# Patient Record
Sex: FEMALE | Race: WHITE | NOT HISPANIC OR LATINO | Employment: OTHER | URBAN - METROPOLITAN AREA
[De-identification: names, ages, dates, MRNs, and addresses within clinical notes are randomized per-mention and may not be internally consistent; named-entity substitution may affect disease eponyms.]

---

## 2024-03-26 ENCOUNTER — APPOINTMENT (OUTPATIENT)
Dept: OTOLARYNGOLOGY | Facility: CLINIC | Age: 79
End: 2024-03-26
Payer: MEDICARE

## 2024-03-26 ENCOUNTER — APPOINTMENT (OUTPATIENT)
Dept: AUDIOLOGY | Facility: CLINIC | Age: 79
End: 2024-03-26
Payer: MEDICARE

## 2024-05-04 NOTE — PROGRESS NOTES
Subjective   Patient ID: Sugey Marie is a 78 y.o. female who presents for Follow-up.  HPI  This 78-year-old female last seen in this office in September of last year is being seen in follow-up due to a past history of sensorineural hearing loss and Ménière's disease.  She had been followed by my previous associate and in February 2020 she had experienced bouts of vertigo.  These episode lasted for a few hours to a day before breaking.  She was seen for vestibular therapy Talya Marei at [facility] who felt she had right vestibular weakness likely secondary to Ménière's disease.   She has been advised to avoid excessive salt in her diet excessive and caffeinated beverages since she had previously felt her vertigo episodes were exacerbated by salt intake..  An audiogram was discussed as a follow-up at this visit.  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    Right shoulder pain 05/08/2024    Sensorineural hearing loss, bilateral 05/08/2024    Status post total right knee replacement 02/26/2018    Vasomotor rhinitis 05/08/2024     Resolved Ambulatory Problems     Diagnosis Date Noted    No Resolved Ambulatory Problems     Past Medical History:   Diagnosis Date    Unspecified osteoarthritis, unspecified site           Current Outpatient Medications:     estradiol (Estrace) 0.01 % (0.1 mg/gram) vaginal cream, Insert 0.5 Applicatorfuls (2 g) into the vagina once daily., Disp: , Rfl:     valACYclovir (Valtrex) 1 gram tablet, Take 1 tablet  "(1,000 mg) by mouth 4 times a day., Disp: , Rfl:     fluticasone (Flonase) 50 mcg/actuation nasal spray, Administer 1 spray into each nostril once daily. Shake gently. Before first use, prime pump. After use, clean tip and replace cap., Disp: , Rfl:     meclizine (Antivert) 25 mg tablet, Take 1 tablet (25 mg) by mouth 3 times a day as needed for dizziness., Disp: , Rfl:     multivitamin tablet, Take 1 tablet by mouth once daily., Disp: , Rfl:     omega-3 acid ethyl esters (Lovaza) 1 gram capsule, Take 1 capsule (1 g) by mouth 2 times a day., Disp: , Rfl:      Social History     Tobacco Use    Smoking status: Never    Smokeless tobacco: Never   Substance Use Topics    Alcohol use: Not Currently        Allergies   Allergen Reactions    Sulfamethoxazole-Trimethoprim Other     Redness and sores in mouth        Height 1.562 m (5' 1.5\"), weight 60.8 kg (134 lb).   Objective   Physical Exam  EXAMINATION:     GENERAL CARMITA.EARANCE: Alert, in no acute distress, normal pitch and clarity of voice, well-developed and nourished, cooperative.     HEAD/FACE: Normocephalic, atraumatic, normal facial movements and strength, no no tenderness to palpation, no lesions noted.     SKIN: Normal turgor, no raised or ulcerative lesions, warm and dry to palpation.     EYES: Extraocular motions intact, no nystagmus noted, pupils equal and reactive to light and accommodation, no conjunctivitis.     EARS: Both ears--external ear anatomy is normal without lesions, auditory canals are patent and without skin abrasions or lesions, mild ceruminous debris is removed from the right ear with a wax loop, hearing is intact to voice, tympanic membranes are intact with no acute inflammation, light reflexes present, no effusions are noted and no mastoid tenderness found to palpation.     NOSE: No external skin lesions are noted, nares are patent, septum is intact, sinuses are nontender to palpation bilaterally, no intranasal lesions or inflammation is noted, " nasal valve is normal.     OROPHARYNX/ORAL CAVITY: Oropharynx is not inflamed and is without lesions, mucosa of the oral cavity is intact and without lesions, tongue is midline and mobile, no acute dental disease is noted, TMJs are mobile     NECK: No lymphadenopathy is palpated, carotid pulses are intact, neck is supple with full range of motion, no thyroid abnormalities are noted, trachea is midline, no neck masses are palpated.     LYMPHATICS: No cervical adenopathy or supraclavicular adenopathy is palpated.     NEUROLOGIC/PSYCH; alert and oriented, cranial nerves are grossly intact, gait is without falling, no motor deficits are noted.        Her audiogram today revealed mild hearing loss from 500 to 8000 Hz moderate in the to low frequencies.  Discrimination scores are 100% bilaterally with normal tympanograms  Assessment/Plan   Problem List Items Addressed This Visit    None  Visit Diagnoses         Codes    Sensorineural hearing loss (SNHL) of both ears    -  Primary H90.3    Meniere's disease of right ear     H81.01          I reviewed the audiogram finds with the patient.  She has stable hearing in both ears with no breakthrough Ménière's symptoms.  She is going to continue to stay well-hydrated, avoid salty foods and avoid caffeine.  If she does have a Ménière's attack then Antivert would be used as a sedative.  She should have her hearing followed yearly and should be protecting her ears from loud noise when exposed.  She has had some vasomotor rhinitis symptoms and we talked about the use of saline as a nasal cleansing spray and the use of a topical steroidal spray.  The use of Atrovent or an antihistamine spray was contraindicated due to eye dryness.  There does not appear to be any other active head neck issues at this time.       Caleb Forde DMD, MD 05/08/24 11:30 AM

## 2024-05-08 ENCOUNTER — OFFICE VISIT (OUTPATIENT)
Dept: OTOLARYNGOLOGY | Facility: CLINIC | Age: 79
End: 2024-05-08
Payer: MEDICARE

## 2024-05-08 ENCOUNTER — CLINICAL SUPPORT (OUTPATIENT)
Dept: AUDIOLOGY | Facility: CLINIC | Age: 79
End: 2024-05-08

## 2024-05-08 VITALS — HEIGHT: 62 IN | WEIGHT: 134 LBS | BODY MASS INDEX: 24.66 KG/M2

## 2024-05-08 DIAGNOSIS — J30.0 VASOMOTOR RHINITIS: ICD-10-CM

## 2024-05-08 DIAGNOSIS — H81.01 MENIERE'S DISEASE OF RIGHT EAR: ICD-10-CM

## 2024-05-08 DIAGNOSIS — H90.3 SENSORINEURAL HEARING LOSS (SNHL) OF BOTH EARS: Primary | ICD-10-CM

## 2024-05-08 PROBLEM — H61.21 EXCESSIVE CERUMEN IN EAR CANAL, RIGHT: Status: ACTIVE | Noted: 2024-05-08

## 2024-05-08 PROBLEM — G56.01 CARPAL TUNNEL SYNDROME, RIGHT: Status: ACTIVE | Noted: 2024-05-08

## 2024-05-08 PROBLEM — N64.89 PSEUDOANGIOMATOUS STROMAL HYPERPLASIA OF BREAST: Status: ACTIVE | Noted: 2018-02-02

## 2024-05-08 PROBLEM — Z96.651 STATUS POST TOTAL RIGHT KNEE REPLACEMENT: Status: ACTIVE | Noted: 2018-02-26

## 2024-05-08 PROBLEM — M25.511 RIGHT SHOULDER PAIN: Status: ACTIVE | Noted: 2024-05-08

## 2024-05-08 PROBLEM — N63.25 BREAST LUMP ON LEFT SIDE AT 3 O'CLOCK POSITION: Status: ACTIVE | Noted: 2018-01-25

## 2024-05-08 PROBLEM — N60.12 FIBROCYSTIC BREAST CHANGES OF BOTH BREASTS: Status: ACTIVE | Noted: 2018-01-25

## 2024-05-08 PROBLEM — N60.11 FIBROCYSTIC BREAST CHANGES OF BOTH BREASTS: Status: ACTIVE | Noted: 2018-01-25

## 2024-05-08 PROCEDURE — 1159F MED LIST DOCD IN RCRD: CPT | Performed by: OTOLARYNGOLOGY

## 2024-05-08 PROCEDURE — 1036F TOBACCO NON-USER: CPT | Performed by: OTOLARYNGOLOGY

## 2024-05-08 PROCEDURE — 1160F RVW MEDS BY RX/DR IN RCRD: CPT | Performed by: OTOLARYNGOLOGY

## 2024-05-08 PROCEDURE — 92557 COMPREHENSIVE HEARING TEST: CPT | Performed by: AUDIOLOGIST

## 2024-05-08 PROCEDURE — 99214 OFFICE O/P EST MOD 30 MIN: CPT | Performed by: OTOLARYNGOLOGY

## 2024-05-08 PROCEDURE — 92567 TYMPANOMETRY: CPT | Performed by: AUDIOLOGIST

## 2024-05-08 RX ORDER — VALACYCLOVIR HYDROCHLORIDE 1 G/1
1000 TABLET, FILM COATED ORAL 4 TIMES DAILY
COMMUNITY
Start: 2024-01-03

## 2024-05-08 RX ORDER — ESTRADIOL 0.1 MG/G
2 CREAM VAGINAL DAILY
COMMUNITY
Start: 2024-02-21

## 2024-05-08 RX ORDER — BISMUTH SUBSALICYLATE 262 MG
1 TABLET,CHEWABLE ORAL DAILY
COMMUNITY

## 2024-05-08 RX ORDER — OMEGA-3-ACID ETHYL ESTERS 1 G/1
1 CAPSULE, LIQUID FILLED ORAL 2 TIMES DAILY
COMMUNITY

## 2024-05-08 RX ORDER — MECLIZINE HYDROCHLORIDE 25 MG/1
25 TABLET ORAL 3 TIMES DAILY PRN
COMMUNITY

## 2024-05-08 RX ORDER — FLUTICASONE PROPIONATE 50 MCG
1 SPRAY, SUSPENSION (ML) NASAL DAILY
COMMUNITY

## 2024-05-08 NOTE — PROGRESS NOTES
[facility]UDIOLOG[facility]  AUDIOMETRIC EVALUATION      Name:  Sugey Marie   :  1945  Age:  78 y.o.  Date of Evaluation:  24    HISTORY    Sugey Marie is seen today at the request of Caleb Forde M.D., SAMUEL, F.A.C.S.    EVALUATION  See scanned audiogram in Media and included at the end of this report.    RESULTS    Otoscopic Evaluation:  Right Ear:  mostly clear   Left Ear:  mostly clear    Tympanometry:   Right Ear:  Type A, consistent with normal eardrum mobility and middle ear pressure   Left Ear:  Type A, consistent with normal eardrum mobility and middle ear pressure    Acoustic reflexes were not completed    Pure Tone Audiometry:    Right Ear:  moderate to normal sensorineural hearing loss  Left Ear:  moderate to normal sensorineural hearing loss       Speech Audiometry:    Right Ear:  excellent in quiet at an elevated presentation level  Left Ear:  excellent in quiet at an elevated presentation level  Speech reception thresholds were in good agreement with pure tone testing.    DISCUSSION  Results were relayed to Caleb Forde M.D., ABA., F.A.C.S.    APPOINTMENT TIME  11:00-11:30am      Moses Diaz  Doctor of Audiology  Senior Audiologist

## 2025-05-21 ENCOUNTER — APPOINTMENT (OUTPATIENT)
Dept: AUDIOLOGY | Facility: CLINIC | Age: 80
End: 2025-05-21
Payer: MEDICARE

## 2025-05-21 ENCOUNTER — OFFICE VISIT (OUTPATIENT)
Dept: OTOLARYNGOLOGY | Facility: CLINIC | Age: 80
End: 2025-05-21
Payer: MEDICARE

## 2025-05-21 ENCOUNTER — APPOINTMENT (OUTPATIENT)
Dept: OTOLARYNGOLOGY | Facility: CLINIC | Age: 80
End: 2025-05-21
Payer: MEDICARE

## 2025-05-21 DIAGNOSIS — H81.01 MENIERE'S DISEASE OF RIGHT EAR: ICD-10-CM

## 2025-05-21 DIAGNOSIS — H90.3 SENSORINEURAL HEARING LOSS (SNHL) OF BOTH EARS: Primary | ICD-10-CM

## 2025-05-21 DIAGNOSIS — J30.0 VASOMOTOR RHINITIS: ICD-10-CM

## 2025-05-21 DIAGNOSIS — H61.21 CERUMEN DEBRIS ON TYMPANIC MEMBRANE OF RIGHT EAR: ICD-10-CM

## 2025-05-21 PROCEDURE — 92557 COMPREHENSIVE HEARING TEST: CPT | Performed by: AUDIOLOGIST

## 2025-05-21 PROCEDURE — 92567 TYMPANOMETRY: CPT | Performed by: AUDIOLOGIST

## 2025-05-21 NOTE — PROGRESS NOTES
PATIENT ID  Sugey Marie IS A 79 y.o. female WHO PRESENTS FOR      HPI   This 79-year-old female is being seen in the office for a yearly follow-up.  She was seen in May of last year.  She does have a history of sensorineural hearing loss and was diagnosed in the past with Ménière's disease.  She has had no recent bouts of vertigo with the last one being in 2020.  She has seen Talya Marie at [place] [facility] for right vestibular weakness in the past.  She has been advised to refrain from salt in her diet heavy caffeinated beverages and also to stay well-hydrated throughout the day.  ROS    A 12 point ROS  has been reviewed and are negative for complaint except for what is stated in the history of present illness and /or for past medical history as noted in the EMR.     Medical History[1]    Current Medications[2]    Social History[3]    RX Allergies[4]     vitals were not taken for this visit.     PHYSICAL EXAM  EXAMINATION:     GENERAL CARMITA.EARANCE: Alert, in no acute distress, normal pitch and clarity of voice, well-developed and nourished, cooperative.     HEAD/FACE: Normocephalic, atraumatic, normal facial movements and strength, no no tenderness to palpation, no lesions noted.     SKIN: Normal turgor, no raised or ulcerative lesions, warm and dry to palpation.     EYES: Extraocular motions intact, no nystagmus noted, pupils equal and reactive to light and accommodation, no conjunctivitis.     EARS: Both ears--external ear anatomy is normal without lesions, auditory canals are patent and without skin abrasions or lesions, mild ceruminous debris is removed from the right ear with a wax loop, hearing is intact to voice, tympanic membranes are intact with no acute inflammation, light reflexes present, no effusions are noted and no mastoid tenderness found to palpation.     NOSE: No external skin lesions are noted, nares are patent, septum is intact, sinuses are nontender to palpation bilaterally, no  intranasal lesions or inflammation is noted, nasal valve is normal.     OROPHARYNX/ORAL CAVITY: Oropharynx is not inflamed and is without lesions, mucosa of the oral cavity is intact and without lesions, tongue is midline and mobile, no acute dental disease is noted, TMJs are mobile     NECK: No lymphadenopathy is palpated, carotid pulses are intact, neck is supple with full range of motion, no thyroid abnormalities are noted, trachea is midline, no neck masses are palpated.     LYMPHATICS: No cervical adenopathy or supraclavicular adenopathy is palpated.     NEUROLOGIC/PSYCH; alert and oriented, cranial nerves are grossly intact, gait is without falling, no motor deficits are noted.     Patient ID: Sugey Marie is a 79 y.o. female.    Ear cerumen removal    Date/Time: 5/21/2025 10:47 AM    Performed by: Caleb Forde DMD, MD  Authorized by: Caleb Forde DMD, MD    Consent:     Consent obtained:  Verbal    Consent given by:  Patient    Risks discussed:  Pain and incomplete removal    Alternatives discussed:  No treatment and alternative treatment  Universal protocol:     Procedure explained and questions answered to patient or proxy's satisfaction: yes      Imaging studies available: no      Required blood products, implants, devices, and special equipment available: no      Patient identity confirmed:  Verbally with patient  Procedure details:     Location:  R ear    Procedure type: curette      Procedure type comment:  Or suction    Procedure outcomes: cerumen removed    Post-procedure details:     Inspection:  No bleeding and ear canal clear    Hearing quality:  Improved    Procedure completion:  Tolerated well, no immediate complications     ` Her audiogram today revealed still a mild hearing loss from 125 Hz up through 1000 Hz and again in the right ear at 4000 to 8000 Hz.  Left ear had similar findings except there was low normal at 4000 Hz.  Discrimination scores 96% on the right at 55 dB high  percent in the left at 50 dB.  Tympanograms were normal    ASSESSMENT/PLAN  Problem List Items Addressed This Visit           ICD-10-CM    Vasomotor rhinitis J30.0     Other Visit Diagnoses         Codes      Sensorineural hearing loss (SNHL) of both ears    -  Primary H90.3      Cerumen debris on tympanic membrane of right ear     H61.21        I discussed the clinical finds with the patient.  From the standpoint of her exam there is no signs of acute inflammation.  She did have some mild ceruminous buildup on the right which was removed.  Both tympanic members are normal in appearance.  I discussed the present hearing test findings with the patient. Since the last test there has been no significant change in the hearing of individual frequencies sound. Discrimination ability remains basically unchanged. It would be advised that a yearly audiogram be done unless symptoms develop in regards to progressive loss, new onset vertigo, or changes regarding tinnitus. Avoidance of loud noise without ear protection is advised. Rehabilitation using hearing aids is available for her use if she is having difficulties during certain events.  She seems otherwise to be doing well without the use of her hearing aid.       [1]   Past Medical History:  Diagnosis Date    Unspecified osteoarthritis, unspecified site     Osteoarthritis (arthritis due to wear and tear of joints)   [2]   Current Outpatient Medications:     estradiol (Estrace) 0.01 % (0.1 mg/gram) vaginal cream, Insert 0.5 Applicatorfuls (2 g) into the vagina once daily., Disp: , Rfl:     fluticasone (Flonase) 50 mcg/actuation nasal spray, Administer 1 spray into each nostril once daily. Shake gently. Before first use, prime pump. After use, clean tip and replace cap. (Patient not taking: Reported on 5/21/2025), Disp: , Rfl:     meclizine (Antivert) 25 mg tablet, Take 1 tablet (25 mg) by mouth 3 times a day as needed for dizziness. (Patient not taking: Reported on  5/21/2025), Disp: , Rfl:     multivitamin tablet, Take 1 tablet by mouth once daily., Disp: , Rfl:     omega-3 acid ethyl esters (Lovaza) 1 gram capsule, Take 1 capsule (1 g) by mouth 2 times a day., Disp: , Rfl:     valACYclovir (Valtrex) 1 gram tablet, Take 1 tablet (1,000 mg) by mouth 4 times a day. (Patient not taking: Reported on 5/21/2025), Disp: , Rfl:   [3]   Social History  Tobacco Use    Smoking status: Never    Smokeless tobacco: Never   Substance Use Topics    Alcohol use: Not Currently    Drug use: Never   [4]   Allergies  Allergen Reactions    Sulfamethoxazole-Trimethoprim Other     Redness and sores in mouth

## 2026-05-26 ENCOUNTER — APPOINTMENT (OUTPATIENT)
Dept: OTOLARYNGOLOGY | Facility: CLINIC | Age: 81
End: 2026-05-26
Payer: MEDICARE

## 2026-05-26 ENCOUNTER — APPOINTMENT (OUTPATIENT)
Dept: AUDIOLOGY | Facility: CLINIC | Age: 81
End: 2026-05-26
Payer: MEDICARE